# Patient Record
Sex: FEMALE | Race: WHITE
[De-identification: names, ages, dates, MRNs, and addresses within clinical notes are randomized per-mention and may not be internally consistent; named-entity substitution may affect disease eponyms.]

---

## 2018-02-10 ENCOUNTER — HOSPITAL ENCOUNTER (EMERGENCY)
Dept: HOSPITAL 72 - EMR | Age: 28
Discharge: HOME | End: 2018-02-10
Payer: COMMERCIAL

## 2018-02-10 VITALS — WEIGHT: 130 LBS | HEIGHT: 65 IN | BODY MASS INDEX: 21.66 KG/M2

## 2018-02-10 VITALS — SYSTOLIC BLOOD PRESSURE: 126 MMHG | DIASTOLIC BLOOD PRESSURE: 83 MMHG

## 2018-02-10 VITALS — DIASTOLIC BLOOD PRESSURE: 83 MMHG | SYSTOLIC BLOOD PRESSURE: 126 MMHG

## 2018-02-10 DIAGNOSIS — Z91.018: ICD-10-CM

## 2018-02-10 DIAGNOSIS — Z88.0: ICD-10-CM

## 2018-02-10 DIAGNOSIS — K51.30: Primary | ICD-10-CM

## 2018-02-10 LAB
ADD MANUAL DIFF: NO
ALBUMIN SERPL-MCNC: 3.4 G/DL (ref 3.4–5)
ALBUMIN/GLOB SERPL: 0.9 {RATIO} (ref 1–2.7)
ALP SERPL-CCNC: 28 U/L (ref 46–116)
ALT SERPL-CCNC: 21 U/L (ref 12–78)
ANION GAP SERPL CALC-SCNC: 8 MMOL/L (ref 5–15)
APPEARANCE UR: CLEAR
APTT PPP: (no result) S
AST SERPL-CCNC: 16 U/L (ref 15–37)
BASOPHILS NFR BLD AUTO: 0.9 % (ref 0–2)
BILIRUB SERPL-MCNC: 1 MG/DL (ref 0.2–1)
BUN SERPL-MCNC: 11 MG/DL (ref 7–18)
CALCIUM SERPL-MCNC: 9.4 MG/DL (ref 8.5–10.1)
CHLORIDE SERPL-SCNC: 101 MMOL/L (ref 98–107)
CO2 SERPL-SCNC: 27 MMOL/L (ref 21–32)
CREAT SERPL-MCNC: 0.8 MG/DL (ref 0.55–1.3)
EOSINOPHIL NFR BLD AUTO: 0.8 % (ref 0–3)
ERYTHROCYTE [DISTWIDTH] IN BLOOD BY AUTOMATED COUNT: 10.9 % (ref 11.6–14.8)
GLOBULIN SER-MCNC: 4 G/DL
GLUCOSE UR STRIP-MCNC: NEGATIVE MG/DL
HCT VFR BLD CALC: 40.6 % (ref 37–47)
HGB BLD-MCNC: 14.3 G/DL (ref 12–16)
KETONES UR QL STRIP: NEGATIVE
LEUKOCYTE ESTERASE UR QL STRIP: NEGATIVE
LYMPHOCYTES NFR BLD AUTO: 28.8 % (ref 20–45)
MCV RBC AUTO: 90 FL (ref 80–99)
MONOCYTES NFR BLD AUTO: 8 % (ref 1–10)
NEUTROPHILS NFR BLD AUTO: 61.5 % (ref 45–75)
NITRITE UR QL STRIP: NEGATIVE
PH UR STRIP: 8 [PH] (ref 4.5–8)
PLATELET # BLD: 301 K/UL (ref 150–450)
POTASSIUM SERPL-SCNC: 3.7 MMOL/L (ref 3.5–5.1)
PROT UR QL STRIP: NEGATIVE
RBC # BLD AUTO: 4.53 M/UL (ref 4.2–5.4)
SODIUM SERPL-SCNC: 136 MMOL/L (ref 136–145)
SP GR UR STRIP: 1.01 (ref 1–1.03)
UROBILINOGEN UR-MCNC: NORMAL MG/DL (ref 0–1)
WBC # BLD AUTO: 11.9 K/UL (ref 4.8–10.8)

## 2018-02-10 PROCEDURE — 96374 THER/PROPH/DIAG INJ IV PUSH: CPT

## 2018-02-10 PROCEDURE — 81025 URINE PREGNANCY TEST: CPT

## 2018-02-10 PROCEDURE — 96361 HYDRATE IV INFUSION ADD-ON: CPT

## 2018-02-10 PROCEDURE — 99284 EMERGENCY DEPT VISIT MOD MDM: CPT

## 2018-02-10 PROCEDURE — 83690 ASSAY OF LIPASE: CPT

## 2018-02-10 PROCEDURE — 84702 CHORIONIC GONADOTROPIN TEST: CPT

## 2018-02-10 PROCEDURE — 74177 CT ABD & PELVIS W/CONTRAST: CPT

## 2018-02-10 PROCEDURE — 85025 COMPLETE CBC W/AUTO DIFF WBC: CPT

## 2018-02-10 PROCEDURE — 80053 COMPREHEN METABOLIC PANEL: CPT

## 2018-02-10 PROCEDURE — 76856 US EXAM PELVIC COMPLETE: CPT

## 2018-02-10 PROCEDURE — 81003 URINALYSIS AUTO W/O SCOPE: CPT

## 2018-02-10 PROCEDURE — 36415 COLL VENOUS BLD VENIPUNCTURE: CPT

## 2018-02-10 NOTE — EMERGENCY ROOM REPORT
History of Present Illness


General


Chief Complaint:  Abdominal Pain


Source:  Patient





Present Illness


HPI


29 yo female patient presents with LLQ pain x2 hours.


Patient reports pain began suddenly; describes pain as sharp and constant in 

nature.


Patient denies nausea, vomiting, fever.


Patient denies diarrhea, constipation, vaginal bleed, blood in urine.


Patient reports LMP was 2 weeks ago; was normal for her.


Patient reports she is on OCP; denies pregnancy.


Patient reports she took Percocet 1.5 hours ago for pain; reports mild relief 

of symptoms.


Patient reports being evaluated by PCP a "few weeks" ago for kidney stones; US 

at that time was negative.


Patient does not know last time had a bowel movement; states was "maybe 2 days" 

ago.


Allergies:  


Coded Allergies:  


     Prabhu (Verified  Allergy, Unknown, 17)


     PENICILLINS (Verified  Allergy, Unknown, 17)





Patient History


Past Medical History:  see triage record


Past Surgical History:  none


Social History:  Denies: smoking, alcohol use, drug use


Last Menstrual Period:  18


Pregnant Now:  No


:  0


Para:  0


Reviewed Nursing Documentation:  PMH: Agreed, PSxH: Agreed





Nursing Documentation-PMH


Past Medical History:  No Stated History





Review of Systems


All Other Systems:  negative except mentioned in HPI





Physical Exam





Vital Signs








  Date Time  Temp Pulse Resp B/P (MAP) Pulse Ox O2 Delivery O2 Flow Rate FiO2


 


2/10/18 11:48 97.5 70 15 128/83 94 Room Air  








Sp02 EP Interpretation:  reviewed, normal


General Appearance:  alert, GCS 15, non-toxic, mild distress


Head:  normocephalic, atraumatic


Eyes:  bilateral eye normal inspection, bilateral eye PERRL


ENT:  hearing grossly normal, normal pharynx, no angioedema, normal voice


Neck:  full range of motion, supple/symm/no masses


Respiratory:  chest non-tender, lungs clear, normal breath sounds, speaking 

full sentences


Cardiovascular #1:  regular rate, rhythm, no edema


Gastrointestinal:  normal bowel sounds, soft, guarding - involuntary, 

tenderness - LLQ


Genitourinary:  no CVA tenderness


Musculoskeletal:  back normal, digits/nails normal, gait/station normal, normal 

range of motion, non-tender


Neurologic:  alert, oriented x3, responsive, motor strength/tone normal, 

sensory intact, speech normal


Psychiatric:  mood/affect normal


Skin:  normal color, no rash, warm/dry, well hydrated


Lymphatic:  no adenopathy





Medical Decision Making


PA Attestation


Dr. Bell  is my supervising Physician whom patient management has been 

discussed with.


Diagnostic Impression:  


 Primary Impression:  


 Proctocolitis


 Additional Impression:  


 Constipation


ER Course


Pt. presents to the ED c/o LLQ abdominal pain.





Ddx considered but are not limited to ovarian torsion, ruptured ovarian cyst, 

kidney stone, appendicitis.





Vital signs: are WNL, pt. is afebrile





Begin abdominal workup, ordered labs, IV fluids, imaging, and pain medication.





ED COURSE:


CBC and CMP unremarkable, mild elevation of WBC. (results below)


CT abdomen: normal appendix, constipation, proctocolitis. Results read and 

discussed with radiologist at [facility].


US pelvis: negative


Discuss results with patient.


Informed patient that constipation may be causing symptoms. 


Patient reports understanding of findings.





Patient able to have BM while in ER.


Patient reports improvement of pain symptoms since arrival at ER.


Patient will be discharged home with Rx  with strict ER precautions given to 

patient.





Consult with Dr. Bell during evaluation of patient; Dr. Bell agrees to 

treatment plan.





DISCHARGE: 


Rx provided for Ciprofloxacin


Rx provided for Lactulose





At this time patient is stable for discharge home. Patient is resting 

comfortably, in no acute distress, nontoxic appearing.


Patient instructed to follow up with primary care provider for further 

treatment and referral to GI.


Patient questions asked and answered.


Patient reports understanding and agreement to treatment plan.


Will provide printed patient care instructions, and any necessary 

prescriptions. 


Care plan and follow up instructions have been discussed with the patient prior 

to discharge.


ER precautions provided to patient. Patient instructed to return to ER 

immediately for any new or worsening of symptoms including but not limited to 

fever, worsening of abdominal pain, blood in stool.





Labs








Test


  2/10/18


12:25 2/10/18


12:30


 


Urine Color Pale yellow  


 


Urine Appearance Clear  


 


Urine pH 8 (4.5-8.0)  


 


Urine Specific Gravity


  1.010


(1.005-1.035) 


 


 


Urine Protein


  Negative


(NEGATIVE) 


 


 


Urine Glucose (UA)


  Negative


(NEGATIVE) 


 


 


Urine Ketones


  Negative


(NEGATIVE) 


 


 


Urine Occult Blood


  Negative


(NEGATIVE) 


 


 


Urine Nitrite


  Negative


(NEGATIVE) 


 


 


Urine Bilirubin


  Negative


(NEGATIVE) 


 


 


Urine Urobilinogen


  Normal MG/DL


(0.0-1.0) 


 


 


Urine Leukocyte Esterase


  Negative


(NEGATIVE) 


 


 


Urine HCG, Qualitative Negative  


 


White Blood Count


  


  11.9 K/UL


(4.8-10.8)


 


Red Blood Count


  


  4.53 M/UL


(4.20-5.40)


 


Hemoglobin


  


  14.3 G/DL


(12.0-16.0)


 


Hematocrit


  


  40.6 %


(37.0-47.0)


 


Mean Corpuscular Volume  90 FL (80-99) 


 


Mean Corpuscular Hemoglobin


  


  31.6 PG


(27.0-31.0)


 


Mean Corpuscular Hemoglobin


Concent 


  35.2 G/DL


(32.0-36.0)


 


Red Cell Distribution Width


  


  10.9 %


(11.6-14.8)


 


Platelet Count


  


  301 K/UL


(150-450)


 


Mean Platelet Volume


  


  6.9 FL


(6.5-10.1)


 


Neutrophils (%) (Auto)


  


  61.5 %


(45.0-75.0)


 


Lymphocytes (%) (Auto)


  


  28.8 %


(20.0-45.0)


 


Monocytes (%) (Auto)


  


  8.0 %


(1.0-10.0)


 


Eosinophils (%) (Auto)


  


  0.8 %


(0.0-3.0)


 


Basophils (%) (Auto)


  


  0.9 %


(0.0-2.0)


 


Sodium Level


  


  136 MMOL/L


(136-145)


 


Potassium Level


  


  3.7 MMOL/L


(3.5-5.1)


 


Chloride Level


  


  101 MMOL/L


()


 


Carbon Dioxide Level


  


  27 MMOL/L


(21-32)


 


Anion Gap


  


  8 mmol/L


(5-15)


 


Blood Urea Nitrogen


  


  11 mg/dL


(7-18)


 


Creatinine


  


  0.8 MG/DL


(0.55-1.30)


 


Estimat Glomerular Filtration


Rate 


  > 60 mL/min


(>60)


 


Glucose Level


  


  99 MG/DL


()


 


Calcium Level


  


  9.4 MG/DL


(8.5-10.1)


 


Total Bilirubin


  


  1.0 MG/DL


(0.2-1.0)


 


Aspartate Amino Transf


(AST/SGOT) 


  16 U/L (15-37) 


 


 


Alanine Aminotransferase


(ALT/SGPT) 


  21 U/L (12-78) 


 


 


Alkaline Phosphatase


  


  28 U/L


()


 


Total Protein


  


  7.4 G/DL


(6.4-8.2)


 


Albumin


  


  3.4 G/DL


(3.4-5.0)


 


Globulin  4.0 g/dL 


 


Albumin/Globulin Ratio  0.9 (1.0-2.7) 


 


Lipase


  


  72 U/L


()


 


Human Chorionic Gonadotropin,


Quant 


  < 1 mIU/mL


(1-6)








CT/MRI/US Diagnostic Results


CT/MRI/US Diagnostic Results #1:  


   Imaging Test Ordered:  CT abdomen pelvis


   Impression


[facility]





Normal appendix.





There is fecal and gaseous distention of segments of the colon, correlate with 

history of constipation. There is wall thickening of the left colon, likely due 

to incomplete distention. There is focal wall thickening at the rectosigmoid 

region, best seen on image 70 of series 3. This may be due to peristalsis or 

focal proctocolitis. There is small amount of free fluid in the pelvis, for 

example on image 63 of series 3. 





Small bubbles of gas in the low midline pelvis on images 65 and 66 66 of series 

3 may represent volume averaging with fecal material. Extraluminal gas is 

considered not excluded. Repeat after administration of oral contrast may 

further evaluate.








No radiopaque gallstones. No pancreatitis or pyelonephritis. No hydronephrosis. 

Small incompletely opacified hemangioma versus solid nodule in liver.


CT/MRI/US Diagnostic Results #2:  


   Imaging Test Ordered:  US pelvis


   Impression


Negative for acute findings per ultrasound technician.





Last Vital Signs








  Date Time  Temp Pulse Resp B/P (MAP) Pulse Ox O2 Delivery O2 Flow Rate FiO2


 


2/10/18 11:48 97.5 70 15 128/83 94 Room Air  








Disposition:  HOME, SELF-CARE


Condition:  Stable


Scripts


Lactulose (LACTULOSE*) 20 Gm/30 Ml Solution


30 ML ORAL BID for 3 Days, #118 ML 0 Refills


   Prov: Reymundo Shaw         2/10/18 


Ciprofloxacin Hcl (CIPROFLOXACIN HCL*) 750 Mg Tablet


750 MG ORAL DAILY for 7 Days, #7 TAB 0 Refills


   Prov: Reymundo Shaw         2/10/18


Patient Instructions:  Abdominal Pain, Adult, Constipation, Adult, Easy-to-Read





Additional Instructions:  


Followup with primary care provider in 2-3 days for further treatment and 

referral to GI.


Take medications as directed. 


Patient questions asked and answered.


ER precautions given, patient instructed to return to ER immediately for any 

new or worsening of symptoms.











Reymundo Shaw Feb 10, 2018 12:14

## 2018-02-11 NOTE — DIAGNOSTIC IMAGING REPORT
Indication: Pain

 

Technique: Transabdominal and endovaginal pelvic ultrasound was performed.

 

Findings: Uterus measures 7.6 x 5.2 x 3.2 cm. Endometrial echocomplex measures 4.1 mm

in thickness. No focal uterine abnormality appreciated.

 

Right ovary measures 2.1 x 1.7 x 1.3 cm/2.5 mL. Left ovary measures 2.1 x 1.9 x 1.6

m/3.3 mL. Vascular flow to the bilateral ovaries noted. There may be trace free

pelvic fluid.

 

Impression: 

Bilateral ovaries symmetric in size and with color flow. No evidence to suggest

ovarian torsion at this time.

 

No definite uterine abnormality appreciated.

## 2018-02-11 NOTE — DIAGNOSTIC IMAGING REPORT
Indication: Pain

 

Technique: CT of the abdomen and pelvis utilizing automated exposure control with

intravenous contrast. Venous scanning performed.

 

CT dose: Total .09 mGycm; CTDI vol 12.77 mGy

 

Comparison: None.

 

Findings: Imaged lung bases are clear. Heart size within normal limits. No

pericardial effusion.

 

There is a 1.3 cm low-attenuation lesion in the right hepatic lobe which may

represent a cyst or hemangioma. Portal veins and hepatic veins are patent.

Gallbladder within normal limits. No biliary ductal dilatation.

 

Spleen, adrenal glands and pancreas grossly unremarkable.

 

Kidneys enhance symmetrically. No urinary tract stones or hydronephrosis bilaterally.

Bladder is mildly distended but otherwise unremarkable. Fluid attenuation is noted

within the endometrial canal. Suggestion of right adnexal cysts. There is trace fluid

in the pelvis.

 

No evidence of bowel obstruction. The appendix is not definitively visualized however

there is no secondary signs to suggest acute appendicitis. There is fecal and gaseous

distention of segments of colon. Correlate with history of constipation. There is

possible wall thickening versus underdistention of the left colon. Questionable focal

thickening of the rectosigmoid colon. This may be related to proctocolitis. Small

bubbles of gas in the low midline pelvis may represent volume averaging with thecal

material. Possibility of extraluminal gas thought less likely but not entirely

excluded. Clinical correlation recommended. Repeat exam after administration of oral

contrast may be obtained for further evaluation as clinically indicated

 

Abdominal aorta is normal in caliber. No bulky lymphadenopathy seen. No acute osseous

abnormality noted.

 

IMPRESSION: 

Questionable thickening versus underdistention of the left colon and rectosigmoid

colon. Correlate clinically to exclude possibility of a colitis.

 

Small bubbles of gas in the low midline pelvis may represent volume averaging with

fecal material. Extraluminal gas thought less likely but not entirely excluded.

Clinical correlation recommended. Repeat exam after administration of oral contrast

may be obtained for definitive evaluation.

 

1.3 cm low-attenuation lesion in the right hepatic lobe may represent cyst versus

hemangioma. Correlation with ultrasound recommended.

 

This corresponds with the [facility] preliminary report.

 

The CT scanner at [facility] is accredited by the [employer]

[employer] and the scans are performed using protocols designed to limit radiation

exposure to as low as reasonably achievable to attain images of sufficient resolution

adequate for diagnostic evaluation.